# Patient Record
Sex: MALE | Race: WHITE | NOT HISPANIC OR LATINO | URBAN - METROPOLITAN AREA
[De-identification: names, ages, dates, MRNs, and addresses within clinical notes are randomized per-mention and may not be internally consistent; named-entity substitution may affect disease eponyms.]

---

## 2024-01-05 PROBLEM — F41.9 ANXIETY: Status: ACTIVE | Noted: 2024-01-05

## 2024-01-05 PROBLEM — R63.4 WEIGHT LOSS: Status: ACTIVE | Noted: 2024-01-05

## 2024-01-05 PROBLEM — J30.9 ALLERGIC RHINITIS: Status: ACTIVE | Noted: 2024-01-05

## 2024-01-05 PROBLEM — F41.1 GENERALIZED ANXIETY DISORDER: Status: ACTIVE | Noted: 2024-01-05

## 2024-01-05 PROBLEM — F90.2 ADHD (ATTENTION DEFICIT HYPERACTIVITY DISORDER), COMBINED TYPE: Status: ACTIVE | Noted: 2024-01-05

## 2024-01-05 RX ORDER — SERTRALINE HYDROCHLORIDE 100 MG/1
1 TABLET, FILM COATED ORAL DAILY
COMMUNITY
End: 2024-01-10 | Stop reason: ALTCHOICE

## 2024-01-05 RX ORDER — SERTRALINE HYDROCHLORIDE 100 MG/1
1 TABLET, FILM COATED ORAL DAILY
COMMUNITY
Start: 2014-02-05 | End: 2024-01-08 | Stop reason: ALTCHOICE

## 2024-01-05 RX ORDER — AZELASTINE 1 MG/ML
SPRAY, METERED NASAL
COMMUNITY
Start: 2017-05-11

## 2024-01-05 RX ORDER — METHYLPHENIDATE HYDROCHLORIDE 36 MG/1
1 TABLET ORAL DAILY
COMMUNITY
Start: 2016-09-06

## 2024-01-05 RX ORDER — SERTRALINE HYDROCHLORIDE 50 MG/1
1 TABLET, FILM COATED ORAL NIGHTLY
COMMUNITY
Start: 2021-08-10 | End: 2024-01-10 | Stop reason: ALTCHOICE

## 2024-01-05 RX ORDER — MIRTAZAPINE 15 MG/1
TABLET, FILM COATED ORAL
COMMUNITY
Start: 2023-05-05

## 2024-01-10 ENCOUNTER — TELEPHONE (OUTPATIENT)
Dept: PEDIATRICS | Facility: CLINIC | Age: 24
End: 2024-01-10

## 2024-01-10 ENCOUNTER — OFFICE VISIT (OUTPATIENT)
Dept: PEDIATRICS | Facility: CLINIC | Age: 24
End: 2024-01-10
Payer: COMMERCIAL

## 2024-01-10 VITALS
DIASTOLIC BLOOD PRESSURE: 69 MMHG | WEIGHT: 151 LBS | HEIGHT: 71 IN | HEART RATE: 95 BPM | SYSTOLIC BLOOD PRESSURE: 112 MMHG | BODY MASS INDEX: 21.14 KG/M2

## 2024-01-10 DIAGNOSIS — Z00.00 WELLNESS EXAMINATION: Primary | ICD-10-CM

## 2024-01-10 DIAGNOSIS — J02.9 PHARYNGITIS, UNSPECIFIED ETIOLOGY: ICD-10-CM

## 2024-01-10 PROBLEM — F41.9 ANXIETY: Status: RESOLVED | Noted: 2024-01-05 | Resolved: 2024-01-10

## 2024-01-10 PROBLEM — R63.4 WEIGHT LOSS: Status: RESOLVED | Noted: 2024-01-05 | Resolved: 2024-01-10

## 2024-01-10 LAB — POC RAPID STREP: NEGATIVE

## 2024-01-10 PROCEDURE — 87880 STREP A ASSAY W/OPTIC: CPT | Performed by: PEDIATRICS

## 2024-01-10 PROCEDURE — 87651 STREP A DNA AMP PROBE: CPT

## 2024-01-10 PROCEDURE — 99395 PREV VISIT EST AGE 18-39: CPT | Performed by: PEDIATRICS

## 2024-01-10 NOTE — PROGRESS NOTES
"Subjective   Patient ID: Michael Rivera is a 23 y.o. male who presents for well child visit    Nutrition: healthy diet  Sleep: no issues.   Remeron helps  School;  performing well.  No academic or behavioral concerns     [facility]  Menstruation: n/a  Work:   Sports/activities:  Smoking/vaping: no  Drug use: no  Sexually active? Yes.  Discussed protection    Other:  sore throat and fatigue for 3 days. No cold symptoms    Objective   /69   Pulse 95   Ht 1.803 m (5' 11\")   Wt 68.5 kg (151 lb)   BMI 21.06 kg/m²   BSA: 1.85 meters squared  Growth percentiles: Facility age limit for growth %joe is 20 years. Facility age limit for growth %joe is 20 years.     Physical Exam  Constitutional:       General: He is not in acute distress.  HENT:      Right Ear: Tympanic membrane normal.      Left Ear: Tympanic membrane normal.      Mouth/Throat:      Pharynx: Oropharynx is clear.   Eyes:      Conjunctiva/sclera: Conjunctivae normal.   Cardiovascular:      Rate and Rhythm: Normal rate.      Heart sounds: No murmur heard.  Pulmonary:      Effort: No respiratory distress.      Breath sounds: Normal breath sounds.   Abdominal:      Palpations: There is no mass.   Musculoskeletal:         General: Normal range of motion.   Lymphadenopathy:      Cervical: No cervical adenopathy.   Skin:     Findings: No rash.   Neurological:      General: No focal deficit present.      Mental Status: He is alert.         Assessment/Plan   Healthy young adult  Vaccines:  up to date  Followed by psychiatrist for adhd and anxiety.  On concerta and remeron  Pharyngitis, r/o strep.  Neg quick test.  Supportive care.  Strep pcr pending.  No cocern for mono clinically.  Followup 5 days if not better  Discussed healthy diet and exercise      Elver Varghese MD     "

## 2024-01-11 LAB — S PYO DNA THROAT QL NAA+PROBE: NOT DETECTED
